# Patient Record
Sex: FEMALE | Race: WHITE | NOT HISPANIC OR LATINO
[De-identification: names, ages, dates, MRNs, and addresses within clinical notes are randomized per-mention and may not be internally consistent; named-entity substitution may affect disease eponyms.]

---

## 2023-03-03 PROBLEM — Z00.00 ENCOUNTER FOR PREVENTIVE HEALTH EXAMINATION: Status: ACTIVE | Noted: 2023-03-03

## 2023-03-21 ENCOUNTER — APPOINTMENT (OUTPATIENT)
Dept: ORTHOPEDIC SURGERY | Facility: CLINIC | Age: 78
End: 2023-03-21
Payer: MEDICARE

## 2023-03-21 DIAGNOSIS — Z78.9 OTHER SPECIFIED HEALTH STATUS: ICD-10-CM

## 2023-03-21 DIAGNOSIS — Z87.891 PERSONAL HISTORY OF NICOTINE DEPENDENCE: ICD-10-CM

## 2023-03-21 DIAGNOSIS — M81.0 AGE-RELATED OSTEOPOROSIS W/OUT CURRENT PATHOLOGICAL FRACTURE: ICD-10-CM

## 2023-03-21 PROCEDURE — 99214 OFFICE O/P EST MOD 30 MIN: CPT

## 2023-03-21 PROCEDURE — 72040 X-RAY EXAM NECK SPINE 2-3 VW: CPT

## 2023-03-21 NOTE — ASSESSMENT
[FreeTextEntry1] : 76 yo F with acute onset of kyphosis after COVID infection in Jan 2023. MRI C and TS to r/o acute compression fracture. Going to obtain CKMB, aldolase and spinal muscle atrophy labs to see if there is myositis or other musculoskeletal causes; will call with results will likely refer for rheumatology consult from there\par \par Progress note completed by Tess Huizar PA-C under the supervision of iMgue De León MD

## 2023-03-21 NOTE — HISTORY OF PRESENT ILLNESS
[Neck] : neck [5] : 5 [Constant] : constant [Sleep] : sleep [Nothing helps with pain getting better] : Nothing helps with pain getting better [de-identified] : 76 yo F presenting neck/upper back pain x years, daughter reporst acutely worsening kyphosis x 1/2023 - no specific injury but noticed it soon after covid infection. "head feels heavy, sinking sensation". Daughter reports she had no deformity in the few months prior to this. Hx of T8 compression fracture, no interventions healed uneventfully. No other prior spine issues. No PT, injections spinal surgeries.  [] : no [FreeTextEntry5] : MAICO GONSALES is a 77 year of female presenting with neck pain. Finds that she is unable to hold head up; unable to turn head left to right. Started after having Covid 01/2023.  Had T spine fracture in 2018.

## 2023-03-22 ENCOUNTER — FORM ENCOUNTER (OUTPATIENT)
Age: 78
End: 2023-03-22

## 2023-03-23 ENCOUNTER — APPOINTMENT (OUTPATIENT)
Dept: MRI IMAGING | Facility: CLINIC | Age: 78
End: 2023-03-23
Payer: MEDICARE

## 2023-03-23 DIAGNOSIS — M62.89 OTHER SPECIFIED DISORDERS OF MUSCLE: ICD-10-CM

## 2023-03-23 PROCEDURE — 72146 MRI CHEST SPINE W/O DYE: CPT | Mod: MH

## 2023-03-23 PROCEDURE — 72141 MRI NECK SPINE W/O DYE: CPT | Mod: MH

## 2023-04-13 PROBLEM — M62.89 MUSCLE FATIGUE: Status: ACTIVE | Noted: 2023-04-13

## 2023-04-25 LAB
ALDOLASE SERPL-CCNC: 2.9 U/L
AR GENE MUT ANL BLD/T: NORMAL
CK MB BLD-MCNC: <1 NG/ML

## 2023-07-26 ENCOUNTER — TRANSCRIPTION ENCOUNTER (OUTPATIENT)
Age: 78
End: 2023-07-26

## 2023-08-28 ENCOUNTER — APPOINTMENT (OUTPATIENT)
Dept: ORTHOPEDIC SURGERY | Facility: CLINIC | Age: 78
End: 2023-08-28

## 2023-08-28 ENCOUNTER — APPOINTMENT (OUTPATIENT)
Dept: ORTHOPEDIC SURGERY | Facility: CLINIC | Age: 78
End: 2023-08-28
Payer: MEDICARE

## 2023-08-28 DIAGNOSIS — M40.209 UNSPECIFIED KYPHOSIS, SITE UNSPECIFIED: ICD-10-CM

## 2023-08-28 DIAGNOSIS — M43.8X9 OTHER SPECIFIED DEFORMING DORSOPATHIES, SITE UNSPECIFIED: ICD-10-CM

## 2023-08-28 DIAGNOSIS — E07.9 DISORDER OF THYROID, UNSPECIFIED: ICD-10-CM

## 2023-08-28 DIAGNOSIS — S22.000A WEDGE COMPRESSION FRACTURE OF UNSPECIFIED THORACIC VERTEBRA, INITIAL ENCOUNTER FOR CLOSED FRACTURE: ICD-10-CM

## 2023-08-28 DIAGNOSIS — M50.90 CERVICAL DISC DISORDER, UNSPECIFIED, UNSPECIFIED CERVICAL REGION: ICD-10-CM

## 2023-08-28 DIAGNOSIS — M54.2 CERVICALGIA: ICD-10-CM

## 2023-08-28 DIAGNOSIS — I10 ESSENTIAL (PRIMARY) HYPERTENSION: ICD-10-CM

## 2023-08-28 PROCEDURE — 99214 OFFICE O/P EST MOD 30 MIN: CPT

## 2023-08-28 RX ORDER — LEVOTHYROXINE SODIUM 0.17 MG/1
TABLET ORAL
Refills: 0 | Status: ACTIVE | COMMUNITY

## 2023-08-28 RX ORDER — DULOXETINE HYDROCHLORIDE 40 MG/1
CAPSULE, DELAYED RELEASE PELLETS ORAL
Refills: 0 | Status: ACTIVE | COMMUNITY

## 2023-08-28 RX ORDER — ATENOLOL 50 MG/1
TABLET ORAL
Refills: 0 | Status: ACTIVE | COMMUNITY

## 2023-08-29 PROBLEM — M43.8X9 SAGITTAL PLANE IMBALANCE: Status: ACTIVE | Noted: 2023-04-13

## 2023-08-29 NOTE — DATA REVIEWED
[Cervical Spine] : cervical spine [MRI] : MRI [Thoracic Spine] : thoracic spine [Report was reviewed and noted in the chart] : The report was reviewed and noted in the chart [I independently reviewed and interpreted images and report] : I independently reviewed and interpreted images and report [I reviewed the films/CD] : I reviewed the films/CD [FreeTextEntry1] : CS MRI: Diffuse mild DDD, small HNP/osteophytes throughout C2-3 -C6-7, no spinal/nerve compression.  TS MRI: T3-4 and T12 vertebral chronic compression fx. Mild-moderate DDD, no spinal/nerve compression.

## 2023-08-29 NOTE — ASSESSMENT
[FreeTextEntry1] : 76 yo F with acute onset of kyphosis after COVID infection in Jan 2023. Since last visit saw Rheum, started medication with good relief. Did PT with great benefit. CS MRI: Diffuse mild DDD, small HNP/osteophytes throughout C2-3 -C6-7, no spinal/nerve compression; TS MRI: T3-4 and T12 vertebral chronic compression fx. Mild-moderate DDD, no spinal/nerve compression. No surgical intervention needed, refer to spine center to c/w tx.

## 2023-08-29 NOTE — HISTORY OF PRESENT ILLNESS
[Neck] : neck [5] : 5 [Constant] : constant [Sleep] : sleep [Nothing helps with pain getting better] : Nothing helps with pain getting better [de-identified] : 76 yo F presenting neck/upper back pain x years, daughter reporst acutely worsening kyphosis x 1/2023 - no specific injury but noticed it soon after covid infection. "head feels heavy, sinking sensation". Daughter reports she had no deformity in the few months prior to this. Hx of T8 compression fracture, no interventions healed uneventfully. No other prior spine issues. No PT, injections spinal surgeries.   08/28/2023 Here for a f/u of neck and Tspine. Neck pain persists, difficulty with ROM. No radiating down UE. Neck symptoms started 6 months ago. Saw Rheum a month ago and started medication that has helped n good measure. Started PT with good relief.   [] : no [FreeTextEntry5] : Carmen is here today to review MRI results of her C and T-spine. c/o lower back pain. report feeling better since new rx of duloxetine 30mg. wearing neck collar.

## 2023-08-29 NOTE — PHYSICAL EXAM
[] : no trapezial tenderness [de-identified] : extension 0 degrees [FreeTextEntry1] : T8 wedge deformity hx of compression fracture

## 2025-09-09 ENCOUNTER — APPOINTMENT (OUTPATIENT)
Dept: ORTHOPEDIC SURGERY | Facility: CLINIC | Age: 80
End: 2025-09-09

## 2025-09-09 ENCOUNTER — APPOINTMENT (OUTPATIENT)
Dept: ORTHOPEDIC SURGERY | Facility: CLINIC | Age: 80
End: 2025-09-09
Payer: MEDICARE

## 2025-09-09 PROCEDURE — 72100 X-RAY EXAM L-S SPINE 2/3 VWS: CPT

## 2025-09-09 PROCEDURE — 99204 OFFICE O/P NEW MOD 45 MIN: CPT | Mod: 25

## 2025-09-09 PROCEDURE — J3490M: CUSTOM | Mod: NC

## 2025-09-09 PROCEDURE — 72170 X-RAY EXAM OF PELVIS: CPT

## 2025-09-09 PROCEDURE — 20552 NJX 1/MLT TRIGGER POINT 1/2: CPT

## 2025-09-09 PROCEDURE — L0625: CPT | Mod: CG

## 2025-09-09 RX ORDER — TRAMADOL HYDROCHLORIDE 50 MG/1
50 TABLET, COATED ORAL
Qty: 20 | Refills: 0 | Status: ACTIVE | COMMUNITY
Start: 2025-09-09 | End: 1900-01-01

## 2025-09-11 ENCOUNTER — APPOINTMENT (OUTPATIENT)
Dept: MRI IMAGING | Facility: CLINIC | Age: 80
End: 2025-09-11
Payer: MEDICARE

## 2025-09-11 PROCEDURE — 72148 MRI LUMBAR SPINE W/O DYE: CPT

## 2025-09-20 ENCOUNTER — APPOINTMENT (OUTPATIENT)
Dept: ORTHOPEDIC SURGERY | Facility: CLINIC | Age: 80
End: 2025-09-20
Payer: MEDICARE

## 2025-09-20 DIAGNOSIS — E78.00 PURE HYPERCHOLESTEROLEMIA, UNSPECIFIED: ICD-10-CM

## 2025-09-20 DIAGNOSIS — S32.020A WEDGE COMPRESSION FRACTURE OF SECOND LUMBAR VERTEBRA, INITIAL ENCOUNTER FOR CLOSED FRACTURE: ICD-10-CM

## 2025-09-20 DIAGNOSIS — I10 ESSENTIAL (PRIMARY) HYPERTENSION: ICD-10-CM

## 2025-09-20 PROCEDURE — 99213 OFFICE O/P EST LOW 20 MIN: CPT

## 2025-09-20 RX ORDER — ATORVASTATIN CALCIUM 80 MG/1
TABLET, FILM COATED ORAL
Refills: 0 | Status: ACTIVE | COMMUNITY

## 2025-09-20 RX ORDER — AMLODIPINE BESYLATE 5 MG/1
TABLET ORAL
Refills: 0 | Status: ACTIVE | COMMUNITY